# Patient Record
Sex: FEMALE | Race: BLACK OR AFRICAN AMERICAN | NOT HISPANIC OR LATINO | Employment: UNEMPLOYED | URBAN - METROPOLITAN AREA
[De-identification: names, ages, dates, MRNs, and addresses within clinical notes are randomized per-mention and may not be internally consistent; named-entity substitution may affect disease eponyms.]

---

## 2019-06-19 LAB
HPV 16: NEGATIVE
HPV 18: NEGATIVE
HPV OTHER HR TYPES: NEGATIVE

## 2019-12-05 PROBLEM — E11.21 DIABETIC NEPHROPATHY ASSOCIATED WITH TYPE 2 DIABETES MELLITUS: Status: ACTIVE | Noted: 2019-12-05

## 2019-12-05 PROBLEM — E87.20 METABOLIC ACIDOSIS: Status: ACTIVE | Noted: 2019-12-05

## 2019-12-05 PROBLEM — N18.5 CKD (CHRONIC KIDNEY DISEASE) STAGE 5, GFR LESS THAN 15 ML/MIN: Status: ACTIVE | Noted: 2019-12-05

## 2019-12-05 PROBLEM — N92.2 EXCESSIVE MENSTRUATION AT PUBERTY: Status: ACTIVE | Noted: 2019-12-05

## 2019-12-05 PROBLEM — I10 ESSENTIAL HYPERTENSION: Status: ACTIVE | Noted: 2019-12-05

## 2019-12-05 PROBLEM — N25.81 SECONDARY HYPERPARATHYROIDISM: Status: ACTIVE | Noted: 2019-12-05

## 2019-12-05 PROBLEM — D50.0 IRON DEFICIENCY ANEMIA DUE TO CHRONIC BLOOD LOSS: Status: ACTIVE | Noted: 2019-12-05

## 2019-12-07 PROBLEM — N92.4 EXCESSIVE BLEEDING IN PREMENOPAUSAL PERIOD: Status: ACTIVE | Noted: 2019-12-05

## 2019-12-08 PROBLEM — D64.9 ANEMIA: Status: ACTIVE | Noted: 2019-12-08

## 2020-01-14 PROBLEM — N92.0 MENORRHAGIA WITH REGULAR CYCLE: Status: ACTIVE | Noted: 2019-12-05

## 2020-02-11 PROBLEM — I16.0 HYPERTENSIVE URGENCY: Status: ACTIVE | Noted: 2020-02-11

## 2020-03-09 PROBLEM — I16.0 HYPERTENSIVE URGENCY: Status: RESOLVED | Noted: 2020-02-11 | Resolved: 2020-03-09

## 2020-03-09 PROBLEM — E87.20 METABOLIC ACIDOSIS: Status: RESOLVED | Noted: 2019-12-05 | Resolved: 2020-03-09

## 2020-03-09 PROBLEM — E83.51 HYPOCALCEMIA: Status: ACTIVE | Noted: 2020-03-09

## 2020-03-10 PROBLEM — E83.51 HYPOCALCEMIA: Status: RESOLVED | Noted: 2020-03-09 | Resolved: 2020-03-10

## 2020-03-10 PROBLEM — D64.9 SYMPTOMATIC ANEMIA: Status: RESOLVED | Noted: 2019-12-08 | Resolved: 2020-03-10

## 2020-09-17 PROBLEM — E11.21 TYPE 2 DIABETES MELLITUS WITH DIABETIC NEPHROPATHY, WITH LONG-TERM CURRENT USE OF INSULIN: Status: ACTIVE | Noted: 2019-05-09

## 2020-09-17 PROBLEM — I50.31 ACUTE DIASTOLIC HEART FAILURE: Status: ACTIVE | Noted: 2019-02-26

## 2020-09-17 PROBLEM — Z91.148 NONCOMPLIANCE WITH MEDICATIONS: Status: ACTIVE | Noted: 2019-02-21

## 2020-09-17 PROBLEM — Z79.4 TYPE 2 DIABETES MELLITUS WITH DIABETIC NEPHROPATHY, WITH LONG-TERM CURRENT USE OF INSULIN: Status: ACTIVE | Noted: 2019-05-09

## 2020-09-17 PROBLEM — E53.8 FOLATE DEFICIENCY: Status: ACTIVE | Noted: 2019-05-07

## 2020-09-17 PROBLEM — N13.4 HYDROURETER, LEFT: Status: ACTIVE | Noted: 2020-09-17

## 2020-11-27 PROBLEM — E83.39 HYPERPHOSPHATEMIA: Status: ACTIVE | Noted: 2020-11-27

## 2020-11-27 PROBLEM — D64.9 SYMPTOMATIC ANEMIA: Status: ACTIVE | Noted: 2020-11-27

## 2020-11-27 PROBLEM — I50.32 CHRONIC DIASTOLIC HEART FAILURE: Status: ACTIVE | Noted: 2020-11-27

## 2021-03-11 PROBLEM — N18.9 ACUTE-ON-CHRONIC KIDNEY INJURY: Status: ACTIVE | Noted: 2021-03-11

## 2021-03-11 PROBLEM — D62 ACUTE ON CHRONIC BLOOD LOSS ANEMIA: Status: ACTIVE | Noted: 2019-12-05

## 2021-03-11 PROBLEM — E78.5 HYPERLIPIDEMIA: Status: ACTIVE | Noted: 2021-03-11

## 2021-03-11 PROBLEM — N18.9 ACUTE-ON-CHRONIC KIDNEY INJURY: Status: RESOLVED | Noted: 2021-03-11 | Resolved: 2021-03-11

## 2021-03-11 PROBLEM — E55.9 VITAMIN D DEFICIENCY: Status: ACTIVE | Noted: 2021-03-11

## 2021-03-11 PROBLEM — N92.1 MENORRHAGIA WITH IRREGULAR CYCLE: Status: ACTIVE | Noted: 2019-12-05

## 2021-03-11 PROBLEM — N17.9 ACUTE-ON-CHRONIC KIDNEY INJURY: Status: ACTIVE | Noted: 2021-03-11

## 2021-03-11 PROBLEM — N18.6 ESRD (END STAGE RENAL DISEASE): Status: ACTIVE | Noted: 2021-03-11

## 2021-03-11 PROBLEM — N17.9 ACUTE-ON-CHRONIC KIDNEY INJURY: Status: RESOLVED | Noted: 2021-03-11 | Resolved: 2021-03-11

## 2021-03-12 PROBLEM — Z99.2 ESRD ON HEMODIALYSIS: Status: ACTIVE | Noted: 2021-03-11

## 2021-03-16 PROBLEM — E87.20 METABOLIC ACIDOSIS: Status: RESOLVED | Noted: 2019-12-05 | Resolved: 2021-03-16

## 2021-04-07 PROBLEM — R06.02 SHORTNESS OF BREATH: Status: ACTIVE | Noted: 2021-03-17

## 2021-04-07 PROBLEM — T50.995A ADVERSE EFFECT OF OTHER DRUGS, MEDICAMENTS AND BIOLOGICAL SUBSTANCES, INITIAL ENCOUNTER: Status: ACTIVE | Noted: 2021-03-18

## 2021-04-07 PROBLEM — D63.1 ANEMIA IN CHRONIC KIDNEY DISEASE: Status: ACTIVE | Noted: 2021-03-17

## 2021-04-07 PROBLEM — Z49.01 ENCOUNTER FOR FITTING AND ADJUSTMENT OF EXTRACORPOREAL DIALYSIS CATHETER: Status: ACTIVE | Noted: 2021-03-18

## 2021-04-07 PROBLEM — N18.6 END STAGE RENAL DISEASE: Status: ACTIVE | Noted: 2021-03-17

## 2021-04-07 PROBLEM — R52 PAIN, UNSPECIFIED: Status: ACTIVE | Noted: 2021-03-17

## 2021-04-07 PROBLEM — Z79.4 LONG TERM (CURRENT) USE OF INSULIN: Status: ACTIVE | Noted: 2021-03-17

## 2021-04-07 PROBLEM — R51.9 HEADACHE, UNSPECIFIED: Status: ACTIVE | Noted: 2021-03-17

## 2021-04-07 PROBLEM — N18.9 ANEMIA IN CHRONIC KIDNEY DISEASE: Status: ACTIVE | Noted: 2021-03-17

## 2021-04-07 PROBLEM — E11.22 TYPE 2 DIABETES MELLITUS WITH DIABETIC CHRONIC KIDNEY DISEASE: Status: ACTIVE | Noted: 2019-05-09

## 2021-04-07 PROBLEM — I50.33 ACUTE ON CHRONIC DIASTOLIC (CONGESTIVE) HEART FAILURE: Status: ACTIVE | Noted: 2021-03-17

## 2021-04-07 PROBLEM — R50.9 FEVER, UNSPECIFIED: Status: ACTIVE | Noted: 2021-03-17

## 2021-04-07 PROBLEM — D50.9 IRON DEFICIENCY ANEMIA, UNSPECIFIED: Status: ACTIVE | Noted: 2021-03-17

## 2021-04-07 PROBLEM — D61.3 IDIOPATHIC APLASTIC ANEMIA: Status: ACTIVE | Noted: 2021-03-17

## 2021-04-07 PROBLEM — Z99.2 DEPENDENCE ON RENAL DIALYSIS: Status: ACTIVE | Noted: 2021-03-17

## 2021-04-07 PROBLEM — D68.9 COAGULATION DEFECT, UNSPECIFIED: Status: ACTIVE | Noted: 2021-03-17

## 2021-07-04 ENCOUNTER — PATIENT OUTREACH (OUTPATIENT)
Dept: ADMINISTRATIVE | Facility: HOSPITAL | Age: 41
End: 2021-07-04

## 2022-03-17 PROBLEM — N28.9 DISORDER OF KIDNEY AND URETER: Status: ACTIVE | Noted: 2022-03-17

## 2022-12-02 ENCOUNTER — PATIENT OUTREACH (OUTPATIENT)
Dept: ADMINISTRATIVE | Facility: HOSPITAL | Age: 42
End: 2022-12-02

## 2022-12-02 NOTE — PROGRESS NOTES
Health Maintenance Due   Topic Date Due    Foot Exam  Never done    Eye Exam  Never done    COVID-19 Vaccine (3 - Booster for Pfizer series) 10/21/2021   Pt states that she got A1c done in around October 2022, but I don't find record of it.  Also, she will sign an GAEL so that we can obtain eye exam report from Renata Roberts MD

## 2023-04-17 ENCOUNTER — PES CALL (OUTPATIENT)
Dept: ADMINISTRATIVE | Facility: OTHER | Age: 43
End: 2023-04-17

## 2024-05-30 PROBLEM — T82.898A PROBLEM WITH DIALYSIS ACCESS: Status: ACTIVE | Noted: 2024-05-30

## 2024-06-26 PROBLEM — R06.02 SHORTNESS OF BREATH: Status: RESOLVED | Noted: 2021-03-17 | Resolved: 2024-06-26

## 2024-06-26 PROBLEM — N18.5 CKD (CHRONIC KIDNEY DISEASE) STAGE 5, GFR LESS THAN 15 ML/MIN: Status: RESOLVED | Noted: 2019-12-05 | Resolved: 2024-06-26

## 2024-06-26 PROBLEM — I50.31 ACUTE DIASTOLIC HEART FAILURE: Status: RESOLVED | Noted: 2019-02-26 | Resolved: 2024-06-26

## 2024-06-26 PROBLEM — R50.9 FEVER, UNSPECIFIED: Status: RESOLVED | Noted: 2021-03-17 | Resolved: 2024-06-26

## 2024-06-26 PROBLEM — Z49.01 ENCOUNTER FOR FITTING AND ADJUSTMENT OF EXTRACORPOREAL DIALYSIS CATHETER: Status: RESOLVED | Noted: 2021-03-18 | Resolved: 2024-06-26

## 2024-06-26 PROBLEM — R51.9 HEADACHE, UNSPECIFIED: Status: RESOLVED | Noted: 2021-03-17 | Resolved: 2024-06-26

## 2024-06-26 PROBLEM — R52 PAIN, UNSPECIFIED: Status: RESOLVED | Noted: 2021-03-17 | Resolved: 2024-06-26

## 2024-11-05 DIAGNOSIS — I10 ESSENTIAL HYPERTENSION: ICD-10-CM

## 2024-11-06 RX ORDER — CARVEDILOL 12.5 MG/1
12.5 TABLET ORAL 2 TIMES DAILY
Qty: 180 TABLET | Refills: 1 | Status: SHIPPED | OUTPATIENT
Start: 2024-11-06

## 2024-11-06 NOTE — TELEPHONE ENCOUNTER
Last Office Visit Info:   The patient's last visit with Charito Olsno MD was on 3/1/2024.    The patient's last visit in current department was on 6/26/2024.        Last CBC Results:   Lab Results   Component Value Date    WBC 6.64 07/20/2022    HGB 10.3 (L) 07/20/2022    HCT 34.1 (L) 07/20/2022     07/20/2022       Last CMP Results  Lab Results   Component Value Date     (L) 04/17/2023    K 4.4 04/17/2023    CL 97 04/17/2023    CO2 19 (L) 04/17/2023    BUN 59 (H) 04/17/2023    CREATININE 10.4 (H) 04/17/2023    CALCIUM 9.3 04/17/2023    ALBUMIN 3.4 (L) 04/17/2023    AST 17 04/17/2023    ALT 22 04/17/2023       Last Lipids  Lab Results   Component Value Date    CHOL 113 (L) 04/17/2023    TRIG 162 (H) 04/17/2023    HDL 29 (L) 04/17/2023    LDLCALC 60 (L) 04/17/2023       Last A1C  Lab Results   Component Value Date    HGBA1C 11.4 (H) 11/08/2023       Last TSH  Lab Results   Component Value Date    TSH 2.750 07/20/2022             Current Med Refills  Medication List with Changes/Refills   Current Medications    ATORVASTATIN (LIPITOR) 40 MG TABLET    Take 1 tablet (40 mg total) by mouth once daily.       Start Date: 5/5/2023  End Date: 6/26/2024    AURYXIA 210 MG IRON TAB           Start Date: 12/14/2022End Date: --    CALCIUM CARBONATE (TUMS) 200 MG CALCIUM (500 MG) CHEWABLE TABLET    Take 1 tablet (500 mg total) by mouth 2 (two) times daily.       Start Date: 11/29/2020End Date: 6/26/2024    CARVEDILOL (COREG) 12.5 MG TABLET    Take 1 tablet (12.5 mg total) by mouth 2 (two) times daily.       Start Date: 3/1/2024  End Date: --    ELIQUIS 2.5 MG TAB    Take 2.5 mg by mouth 2 (two) times daily.       Start Date: 1/4/2022  End Date: --    FERROUS SULFATE (IRON, FERROUS SULFATE,) 325 MG (65 MG IRON) TAB TABLET    Take 1 tablet by mouth twice daily       Start Date: 5/19/2023 End Date: --    FUROSEMIDE (LASIX) 40 MG TABLET    Take 1 tablet (40 mg total) by mouth 2 (two) times daily.       Start  "Date: 6/26/2024 End Date: 12/23/2024    HUMALOG MIX 75-25 KWIKPEN 100 UNIT/ML (75-25) INPN    Inject 8 Units into the skin 2 (two) times daily.       Start Date: 3/1/2024  End Date: 6/26/2024    HYDRALAZINE (APRESOLINE) 25 MG TABLET    Take 1 tablet (25 mg total) by mouth every 8 (eight) hours.       Start Date: 3/1/2024  End Date: 3/1/2025    LISINOPRIL (PRINIVIL,ZESTRIL) 40 MG TABLET    Take 1 tablet (40 mg total) by mouth once daily.       Start Date: 6/26/2024 End Date: 12/23/2024    NIFEDIPINE (PROCARDIA-XL) 90 MG (OSM) 24 HR TABLET    Take 1 tablet (90 mg total) by mouth once daily.       Start Date: 6/26/2024 End Date: 12/23/2024    PEN NEEDLE, DIABETIC 31 GAUGE X 5/16" NDLE    For administration of insulin       Start Date: 3/1/2024  End Date: --    TAMSULOSIN (FLOMAX) 0.4 MG CAP    Take 1 capsule (0.4 mg total) by mouth every evening.       Start Date: 6/10/2024 End Date: 6/10/2025       Order(s) placed per written order guidelines: none    Please advise.              "

## 2024-11-06 NOTE — TELEPHONE ENCOUNTER
Refill Routing Note   Medication(s) are not appropriate for processing by [facility] for the following reason(s):        Required vitals abnormal  ED/Hospital Visit since last OV with provider    [facility] action(s):  Defer   Requires labs : Yes             Appointments  past 12m or future 3m with PCP    Date Provider   Last Visit   3/1/2024 Charito Olson MD   Next Visit   Visit date not found Charito Olson MD   ED visits in past 90 days: 1        Note composed:10:16 AM 11/06/2024

## 2024-11-06 NOTE — TELEPHONE ENCOUNTER
Care Due:                  Date            Visit Type   Department     Provider  --------------------------------------------------------------------------------                                EP -                              PRIMARY      [facility] FAM[facility]  Last Visit: 03-      CARE ([facility])   PRACTICE       Charito Olson  Next Visit: None Scheduled  None         None Found                                                            Last  Test          Frequency    Reason                     Performed    Due Date  --------------------------------------------------------------------------------    CBC.........  12 months..  hydrALAZINE..............  Not Found    Overdue    CMP.........  12 months..  HUMALOG, atorvastatin....  04- 04-    HBA1C.......  6 months...  HUMALOG..................  03- 08-    Lipid Panel.  12 months..  atorvastatin.............  04- 04-    [facility] Embedded Care Due Messages. Reference number: [id number].   11/06/2024 10:07:34 AM CST

## 2024-11-15 PROBLEM — T82.868A AV GRAFT THROMBOSIS, INITIAL ENCOUNTER: Status: ACTIVE | Noted: 2024-11-15

## 2024-11-25 ENCOUNTER — PATIENT OUTREACH (OUTPATIENT)
Dept: ADMINISTRATIVE | Facility: HOSPITAL | Age: 44
End: 2024-11-25

## 2024-11-25 DIAGNOSIS — N18.6 TYPE 2 DIABETES MELLITUS WITH CHRONIC KIDNEY DISEASE ON CHRONIC DIALYSIS, WITH LONG-TERM CURRENT USE OF INSULIN: Primary | ICD-10-CM

## 2024-11-25 DIAGNOSIS — E11.22 TYPE 2 DIABETES MELLITUS WITH CHRONIC KIDNEY DISEASE ON CHRONIC DIALYSIS, WITH LONG-TERM CURRENT USE OF INSULIN: Primary | ICD-10-CM

## 2024-11-25 DIAGNOSIS — Z79.4 TYPE 2 DIABETES MELLITUS WITH CHRONIC KIDNEY DISEASE ON CHRONIC DIALYSIS, WITH LONG-TERM CURRENT USE OF INSULIN: Primary | ICD-10-CM

## 2024-11-25 DIAGNOSIS — Z99.2 TYPE 2 DIABETES MELLITUS WITH CHRONIC KIDNEY DISEASE ON CHRONIC DIALYSIS, WITH LONG-TERM CURRENT USE OF INSULIN: Primary | ICD-10-CM

## 2025-04-24 ENCOUNTER — PATIENT OUTREACH (OUTPATIENT)
Dept: ADMINISTRATIVE | Facility: HOSPITAL | Age: 45
End: 2025-04-24